# Patient Record
Sex: FEMALE | Race: BLACK OR AFRICAN AMERICAN | NOT HISPANIC OR LATINO
[De-identification: names, ages, dates, MRNs, and addresses within clinical notes are randomized per-mention and may not be internally consistent; named-entity substitution may affect disease eponyms.]

---

## 2019-11-07 PROBLEM — Z00.00 ENCOUNTER FOR PREVENTIVE HEALTH EXAMINATION: Status: ACTIVE | Noted: 2019-11-07

## 2019-12-05 ENCOUNTER — LABORATORY RESULT (OUTPATIENT)
Age: 39
End: 2019-12-05

## 2019-12-05 ENCOUNTER — APPOINTMENT (OUTPATIENT)
Dept: OBGYN | Facility: CLINIC | Age: 39
End: 2019-12-05
Payer: MEDICAID

## 2019-12-05 VITALS
DIASTOLIC BLOOD PRESSURE: 68 MMHG | OXYGEN SATURATION: 98 % | HEIGHT: 60 IN | SYSTOLIC BLOOD PRESSURE: 124 MMHG | HEART RATE: 114 BPM | BODY MASS INDEX: 33.18 KG/M2 | WEIGHT: 169 LBS

## 2019-12-05 DIAGNOSIS — D64.9 ANEMIA, UNSPECIFIED: ICD-10-CM

## 2019-12-05 DIAGNOSIS — Z78.9 OTHER SPECIFIED HEALTH STATUS: ICD-10-CM

## 2019-12-05 DIAGNOSIS — Z83.3 FAMILY HISTORY OF DIABETES MELLITUS: ICD-10-CM

## 2019-12-05 DIAGNOSIS — Z86.2 PERSONAL HISTORY OF DISEASES OF THE BLOOD AND BLOOD-FORMING ORGANS AND CERTAIN DISORDERS INVOLVING THE IMMUNE MECHANISM: ICD-10-CM

## 2019-12-05 DIAGNOSIS — Z80.0 FAMILY HISTORY OF MALIGNANT NEOPLASM OF DIGESTIVE ORGANS: ICD-10-CM

## 2019-12-05 DIAGNOSIS — D21.9 BENIGN NEOPLASM OF CONNECTIVE AND OTHER SOFT TISSUE, UNSPECIFIED: ICD-10-CM

## 2019-12-05 LAB
HCG UR QL: NEGATIVE
QUALITY CONTROL: YES

## 2019-12-05 PROCEDURE — 81025 URINE PREGNANCY TEST: CPT

## 2019-12-05 PROCEDURE — 58100 BIOPSY OF UTERUS LINING: CPT

## 2019-12-05 PROCEDURE — 99203 OFFICE O/P NEW LOW 30 MIN: CPT | Mod: 25

## 2019-12-05 NOTE — REVIEW OF SYSTEMS
[Feeling Tired] : feeling tired [Palpitations] : palpitations [Pelvic Pain] : pelvic pain [Abn Vag Bleeding] : abnormal vaginal bleeding [Headache] : headache [Anxiety] : anxiety [Nl] : Hematologic/Lymphatic

## 2019-12-05 NOTE — PROCEDURE
[Endometrial Biopsy] : Endometrial biopsy [Risks] : risks [Benefits] : benefits [Alternatives] : alternatives [Patient] : patient [Infection] : infection [Bleeding] : bleeding [Allergic Reaction] : allergic reaction [Uterine Perforation] : uterine perforation [Pain] : pain [CONSENT OBTAINED] : written consent was obtained prior to the procedure. [Neg Pregnancy Test] : a pregnancy test was negative [Betadine] : Betadine [Badillo Curette] : a Badillo curette [Moderate] : a moderate [Sent to Histology] : the specimen was place in buffered formalin and sent for pathlogy [Tolerated Well] : the patient tolerated the procedure well [No Complications] : there were no complications [de-identified] : VERONICA [de-identified] : allis

## 2019-12-05 NOTE — PHYSICAL EXAM
[Normal] : uterus [Moderate] : there was moderate vaginal bleeding [Enlarged ___ wks] : enlarged [unfilled] ~Uweeks [Uterine Adnexae] : were not tender and not enlarged

## 2019-12-05 NOTE — COUNSELING
[Breast Self Exam] : breast self exam [Nutrition] : nutrition [Exercise] : exercise [Vitamins/Supplements] : vitamins/supplements [STD (testing, results, tx)] : STD (testing, results, tx) [Contraception] : contraception [Medication Management] : medication management [Pre/Post Op Instructions] : pre/post op instructions

## 2019-12-05 NOTE — CHIEF COMPLAINT
[Initial Visit] : initial GYN visit [FreeTextEntry1] : AUB 11/2018 --TVS found myoma and immediately treated with UFE--improved ,myoma smaller.Now AUB x 2 mos--poss DUB never originally addressed. Pt h/o anemia ,feels light heade at times and palps with climbing stairs. refuses to go to ED--disc risks severe anemia incldg  but not limited to mortality/mobidity. Pt has 2 kids 2011,2012--c/s .Currently not employed--to start new job sev wks.Pt had been on OCP--improved AUB but then spotting again-ran out of. Pt had Paraguard x 8 yrs and passed  due to heavy clots according to pt

## 2019-12-10 ENCOUNTER — OTHER (OUTPATIENT)
Age: 39
End: 2019-12-10

## 2019-12-10 ENCOUNTER — ASOB RESULT (OUTPATIENT)
Age: 39
End: 2019-12-10

## 2019-12-10 ENCOUNTER — APPOINTMENT (OUTPATIENT)
Dept: OBGYN | Facility: CLINIC | Age: 39
End: 2019-12-10
Payer: MEDICAID

## 2019-12-10 PROCEDURE — 76857 US EXAM PELVIC LIMITED: CPT

## 2019-12-10 PROCEDURE — 76830 TRANSVAGINAL US NON-OB: CPT

## 2019-12-11 LAB — CORE LAB BIOPSY: NORMAL

## 2019-12-23 NOTE — REVIEW OF SYSTEMS
[Feeling Tired] : feeling tired [Abn Vag Bleeding] : abnormal vaginal bleeding [Anxiety] : anxiety [Nl] : Hematologic/Lymphatic

## 2019-12-24 ENCOUNTER — APPOINTMENT (OUTPATIENT)
Dept: OBGYN | Facility: CLINIC | Age: 39
End: 2019-12-24
Payer: MEDICAID

## 2019-12-24 VITALS
DIASTOLIC BLOOD PRESSURE: 70 MMHG | WEIGHT: 169 LBS | BODY MASS INDEX: 33.18 KG/M2 | HEIGHT: 60 IN | HEART RATE: 101 BPM | OXYGEN SATURATION: 98 % | SYSTOLIC BLOOD PRESSURE: 110 MMHG

## 2019-12-24 DIAGNOSIS — N92.6 IRREGULAR MENSTRUATION, UNSPECIFIED: ICD-10-CM

## 2019-12-24 DIAGNOSIS — N93.9 ABNORMAL UTERINE AND VAGINAL BLEEDING, UNSPECIFIED: ICD-10-CM

## 2019-12-24 PROCEDURE — 99214 OFFICE O/P EST MOD 30 MIN: CPT

## 2019-12-25 NOTE — CHIEF COMPLAINT
[Follow Up] : follow up GYN visit [FreeTextEntry1] : menstrual disorde/AUBr.Pt doing well with po progestin.Rec endom sampling ,pelvic sono and management options--TXA/HTA,Mirena\par P2 c/s

## 2020-01-06 ENCOUNTER — APPOINTMENT (OUTPATIENT)
Dept: OBGYN | Facility: HOSPITAL | Age: 40
End: 2020-01-06

## 2020-02-25 ENCOUNTER — APPOINTMENT (OUTPATIENT)
Dept: OBGYN | Facility: CLINIC | Age: 40
End: 2020-02-25

## 2020-03-02 ENCOUNTER — OUTPATIENT (OUTPATIENT)
Dept: OUTPATIENT SERVICES | Facility: HOSPITAL | Age: 40
LOS: 1 days | End: 2020-03-02
Payer: MEDICAID

## 2020-03-02 VITALS
RESPIRATION RATE: 16 BRPM | SYSTOLIC BLOOD PRESSURE: 136 MMHG | TEMPERATURE: 99 F | HEART RATE: 95 BPM | OXYGEN SATURATION: 98 % | WEIGHT: 173.28 LBS | HEIGHT: 61 IN | DIASTOLIC BLOOD PRESSURE: 87 MMHG

## 2020-03-02 DIAGNOSIS — N93.9 ABNORMAL UTERINE AND VAGINAL BLEEDING, UNSPECIFIED: ICD-10-CM

## 2020-03-02 DIAGNOSIS — Z98.891 HISTORY OF UTERINE SCAR FROM PREVIOUS SURGERY: Chronic | ICD-10-CM

## 2020-03-02 DIAGNOSIS — D25.9 LEIOMYOMA OF UTERUS, UNSPECIFIED: Chronic | ICD-10-CM

## 2020-03-02 DIAGNOSIS — Z01.818 ENCOUNTER FOR OTHER PREPROCEDURAL EXAMINATION: ICD-10-CM

## 2020-03-02 LAB
HCT VFR BLD CALC: 37.7 % — SIGNIFICANT CHANGE UP (ref 34.5–45)
HGB BLD-MCNC: 11.9 G/DL — SIGNIFICANT CHANGE UP (ref 11.5–15.5)
MCHC RBC-ENTMCNC: 25.2 PG — LOW (ref 27–34)
MCHC RBC-ENTMCNC: 31.6 GM/DL — LOW (ref 32–36)
MCV RBC AUTO: 79.7 FL — LOW (ref 80–100)
NRBC # BLD: 0 /100 WBCS — SIGNIFICANT CHANGE UP (ref 0–0)
PLATELET # BLD AUTO: 235 K/UL — SIGNIFICANT CHANGE UP (ref 150–400)
RBC # BLD: 4.73 M/UL — SIGNIFICANT CHANGE UP (ref 3.8–5.2)
RBC # FLD: 17.1 % — HIGH (ref 10.3–14.5)
WBC # BLD: 4.61 K/UL — SIGNIFICANT CHANGE UP (ref 3.8–10.5)
WBC # FLD AUTO: 4.61 K/UL — SIGNIFICANT CHANGE UP (ref 3.8–10.5)

## 2020-03-02 PROCEDURE — 85027 COMPLETE CBC AUTOMATED: CPT

## 2020-03-02 PROCEDURE — G0463: CPT

## 2020-03-02 PROCEDURE — 36415 COLL VENOUS BLD VENIPUNCTURE: CPT

## 2020-03-02 NOTE — H&P PST ADULT - MUSCULOSKELETAL
negative detailed exam no joint swelling/no calf tenderness/ROM intact/no joint warmth/no joint erythema/normal strength

## 2020-03-02 NOTE — H&P PST ADULT - NSANTHOSAYNRD_GEN_A_CORE
No. SASHA screening performed.  STOP BANG Legend: 0-2 = LOW Risk; 3-4 = INTERMEDIATE Risk; 5-8 = HIGH Risk/neck 15 3/4inches

## 2020-03-02 NOTE — H&P PST ADULT - ANESTHESIA, PREVIOUS REACTION, PROFILE
reports s/p  she was very itchy - reports she was never told if it was a reaction to anesthesia. denies fmhx

## 2020-03-02 NOTE — H&P PST ADULT - HISTORY OF PRESENT ILLNESS
39yo female medical h/o Anemia related to abnormal vaginal/uterine bleeding, started 10/2019. Pt presents today for PST D&C, Hysteroscopy Possible Resectoscope Tessa, HTA, Mirena scheduled for 3/16/2020

## 2020-03-02 NOTE — H&P PST ADULT - NEGATIVE GENERAL GENITOURINARY SYMPTOMS
no flank pain L/no incontinence/no gas in urine/no urinary hesitancy/no flank pain R/no urine discoloration/no bladder infections/no hematuria/no renal colic/no dysuria/normal urinary frequency

## 2020-03-02 NOTE — H&P PST ADULT - NSICDXPROBLEM_GEN_ALL_CORE_FT
PROBLEM DIAGNOSES  Problem: Abnormal uterine and vaginal bleeding, unspecified  Assessment and Plan: Pre-op instructiosn given. Pt verbalized understanding  UCG ordered STAT for day of procedure PROBLEM DIAGNOSES  Problem: Abnormal uterine and vaginal bleeding, unspecified  Assessment and Plan: Pre-op instructions given. Pt verbalized understanding  UCG ordered STAT for day of procedure

## 2020-03-25 PROBLEM — D64.9 ANEMIA, UNSPECIFIED: Chronic | Status: ACTIVE | Noted: 2020-03-02

## 2020-06-26 NOTE — H&P PST ADULT - SKIN/BREAST
[phone]  [place]namese  [id number]  PAIN LEVEL 0    Chief Complaint   Patient presents with    Results     per pt wants to discuss results from XRAY and ultrasound 6-2-20     Alopecia     increased hair loss. pt reports it started after recent birth of child. x3 months      1. Have you been to the ER, urgent care clinic since your last visit? Hospitalized since your last visit? No    2. Have you seen or consulted any other health care providers outside of the [address] [place] [address] since your last visit? Include any pap smears or colon screening. No     Health Maintenance Due   Topic Date Due    DTaP/Tdap/Td series (1 - Tdap) 02/07/2011    PAP AKA CERVICAL CYTOLOGY  02/07/2011     3 most recent PHQ Screens 5/22/2020   Little interest or pleasure in doing things Not at all   Feeling down, depressed, irritable, or hopeless Not at all   Total Score PHQ 2 0     Recent Travel Screening and Travel History documentation     Travel Screening       Question Response     In the last month, have you been in contact with someone who was confirmed or suspected to have Coronavirus / COVID-19? No / Unsure     Do you have any of the following symptoms? None of these     Have you traveled internationally in the last month?  No      Travel History   Travel since 05/26/20     No documented travel since 05/26/20 negative

## 2020-07-01 RX ORDER — FERROUS SULFATE 325(65) MG
1 TABLET ORAL
Qty: 0 | Refills: 0 | DISCHARGE

## 2020-07-01 RX ORDER — NORETHINDRONE 0.35 MG/1
5 TABLET ORAL
Qty: 0 | Refills: 0 | DISCHARGE

## 2020-07-02 ENCOUNTER — OUTPATIENT (OUTPATIENT)
Dept: OUTPATIENT SERVICES | Facility: HOSPITAL | Age: 40
LOS: 1 days | End: 2020-07-02
Payer: MEDICAID

## 2020-07-02 VITALS
DIASTOLIC BLOOD PRESSURE: 73 MMHG | OXYGEN SATURATION: 99 % | SYSTOLIC BLOOD PRESSURE: 123 MMHG | RESPIRATION RATE: 16 BRPM | TEMPERATURE: 99 F | WEIGHT: 172.4 LBS | HEIGHT: 60.5 IN | HEART RATE: 88 BPM

## 2020-07-02 DIAGNOSIS — Z01.818 ENCOUNTER FOR OTHER PREPROCEDURAL EXAMINATION: ICD-10-CM

## 2020-07-02 DIAGNOSIS — D64.9 ANEMIA, UNSPECIFIED: ICD-10-CM

## 2020-07-02 DIAGNOSIS — N93.9 ABNORMAL UTERINE AND VAGINAL BLEEDING, UNSPECIFIED: ICD-10-CM

## 2020-07-02 DIAGNOSIS — Z98.891 HISTORY OF UTERINE SCAR FROM PREVIOUS SURGERY: Chronic | ICD-10-CM

## 2020-07-02 DIAGNOSIS — D25.9 LEIOMYOMA OF UTERUS, UNSPECIFIED: Chronic | ICD-10-CM

## 2020-07-02 LAB
HCG SERPL-ACNC: <1 MIU/ML — SIGNIFICANT CHANGE UP
HCT VFR BLD CALC: 41.9 % — SIGNIFICANT CHANGE UP (ref 34.5–45)
HGB BLD-MCNC: 13.2 G/DL — SIGNIFICANT CHANGE UP (ref 11.5–15.5)
MCHC RBC-ENTMCNC: 24.7 PG — LOW (ref 27–34)
MCHC RBC-ENTMCNC: 31.5 GM/DL — LOW (ref 32–36)
MCV RBC AUTO: 78.3 FL — LOW (ref 80–100)
NRBC # BLD: 0 /100 WBCS — SIGNIFICANT CHANGE UP (ref 0–0)
PLATELET # BLD AUTO: 250 K/UL — SIGNIFICANT CHANGE UP (ref 150–400)
RBC # BLD: 5.35 M/UL — HIGH (ref 3.8–5.2)
RBC # FLD: 17.6 % — HIGH (ref 10.3–14.5)
WBC # BLD: 4.56 K/UL — SIGNIFICANT CHANGE UP (ref 3.8–10.5)
WBC # FLD AUTO: 4.56 K/UL — SIGNIFICANT CHANGE UP (ref 3.8–10.5)

## 2020-07-02 PROCEDURE — 84702 CHORIONIC GONADOTROPIN TEST: CPT

## 2020-07-02 PROCEDURE — 36415 COLL VENOUS BLD VENIPUNCTURE: CPT

## 2020-07-02 PROCEDURE — 85027 COMPLETE CBC AUTOMATED: CPT

## 2020-07-02 PROCEDURE — G0463: CPT

## 2020-07-02 NOTE — H&P PST ADULT - NEGATIVE GENERAL GENITOURINARY SYMPTOMS
no renal colic/no flank pain R/normal urinary frequency/no incontinence/no urinary hesitancy/no flank pain L/no hematuria/no urine discoloration/no gas in urine/no dysuria/no bladder infections

## 2020-07-02 NOTE — H&P PST ADULT - BLOOD TRANSFUSION, PREVIOUS, PROFILE
yes/low H&H s/p [facility]

## 2020-07-02 NOTE — H&P PST ADULT - GENITOURINARY COMMENTS
c/o abnormal vaginal bleeding/spotting since 10/2019 11/2019 on norethindrone which has helped to stop bleeding

## 2020-07-02 NOTE — H&P PST ADULT - NSICDXPROBLEM_GEN_ALL_CORE_FT
PROBLEM DIAGNOSES  Problem: Abnormal uterine and vaginal bleeding  Assessment and Plan: Pre-op instructions given. Pt verbalized understanding   Pending; Covid19 results to be done 7/3    Problem: Anemia  Assessment and Plan: Pt instructed to take meds as prescribed

## 2020-07-02 NOTE — H&P PST ADULT - HISTORY OF PRESENT ILLNESS
39yo female medical h/o Anemia related to abnormal vaginal/uterine bleeding, started 10/2019. Pt presents today for PST D&C, Hysteroscopy Possible Resectoscope Tessa, HTA, Mirena scheduled for 7/6/2020

## 2020-07-02 NOTE — H&P PST ADULT - NSANTHOSAYNRD_GEN_A_CORE
No. SASHA screening performed.  STOP BANG Legend: 0-2 = LOW Risk; 3-4 = INTERMEDIATE Risk; 5-8 = HIGH Risk/neck 15inches

## 2020-07-03 ENCOUNTER — APPOINTMENT (OUTPATIENT)
Dept: DISASTER EMERGENCY | Facility: CLINIC | Age: 40
End: 2020-07-03

## 2020-07-03 DIAGNOSIS — Z01.818 ENCOUNTER FOR OTHER PREPROCEDURAL EXAMINATION: ICD-10-CM

## 2020-07-03 PROBLEM — K21.9 GASTRO-ESOPHAGEAL REFLUX DISEASE WITHOUT ESOPHAGITIS: Chronic | Status: ACTIVE | Noted: 2020-07-01

## 2020-07-03 PROBLEM — N92.0 EXCESSIVE AND FREQUENT MENSTRUATION WITH REGULAR CYCLE: Chronic | Status: ACTIVE | Noted: 2020-07-01

## 2020-07-03 PROBLEM — Z87.42 PERSONAL HISTORY OF OTHER DISEASES OF THE FEMALE GENITAL TRACT: Chronic | Status: ACTIVE | Noted: 2020-07-01

## 2020-07-03 LAB — SARS-COV-2 N GENE NPH QL NAA+PROBE: NOT DETECTED

## 2020-07-05 ENCOUNTER — TRANSCRIPTION ENCOUNTER (OUTPATIENT)
Age: 40
End: 2020-07-05

## 2020-07-06 ENCOUNTER — OUTPATIENT (OUTPATIENT)
Dept: OUTPATIENT SERVICES | Facility: HOSPITAL | Age: 40
LOS: 1 days | End: 2020-07-06
Payer: MEDICAID

## 2020-07-06 ENCOUNTER — RESULT REVIEW (OUTPATIENT)
Age: 40
End: 2020-07-06

## 2020-07-06 ENCOUNTER — APPOINTMENT (OUTPATIENT)
Dept: OBGYN | Facility: HOSPITAL | Age: 40
End: 2020-07-06

## 2020-07-06 VITALS
OXYGEN SATURATION: 97 % | RESPIRATION RATE: 14 BRPM | TEMPERATURE: 98 F | HEART RATE: 87 BPM | SYSTOLIC BLOOD PRESSURE: 123 MMHG | DIASTOLIC BLOOD PRESSURE: 81 MMHG

## 2020-07-06 VITALS
DIASTOLIC BLOOD PRESSURE: 84 MMHG | WEIGHT: 172.4 LBS | SYSTOLIC BLOOD PRESSURE: 123 MMHG | RESPIRATION RATE: 14 BRPM | HEART RATE: 84 BPM | HEIGHT: 60.5 IN | TEMPERATURE: 98 F | OXYGEN SATURATION: 99 %

## 2020-07-06 DIAGNOSIS — Z98.891 HISTORY OF UTERINE SCAR FROM PREVIOUS SURGERY: Chronic | ICD-10-CM

## 2020-07-06 DIAGNOSIS — D25.9 LEIOMYOMA OF UTERUS, UNSPECIFIED: Chronic | ICD-10-CM

## 2020-07-06 DIAGNOSIS — N93.9 ABNORMAL UTERINE AND VAGINAL BLEEDING, UNSPECIFIED: ICD-10-CM

## 2020-07-06 PROCEDURE — 88305 TISSUE EXAM BY PATHOLOGIST: CPT

## 2020-07-06 PROCEDURE — 88305 TISSUE EXAM BY PATHOLOGIST: CPT | Mod: 26

## 2020-07-06 PROCEDURE — 58563 HYSTEROSCOPY ABLATION: CPT | Mod: AS

## 2020-07-06 PROCEDURE — 58300 INSERT INTRAUTERINE DEVICE: CPT

## 2020-07-06 PROCEDURE — 58563 HYSTEROSCOPY ABLATION: CPT

## 2020-07-06 RX ORDER — HYDROMORPHONE HYDROCHLORIDE 2 MG/ML
0.5 INJECTION INTRAMUSCULAR; INTRAVENOUS; SUBCUTANEOUS
Refills: 0 | Status: DISCONTINUED | OUTPATIENT
Start: 2020-07-06 | End: 2020-07-06

## 2020-07-06 RX ORDER — SODIUM CHLORIDE 9 MG/ML
1000 INJECTION, SOLUTION INTRAVENOUS
Refills: 0 | Status: DISCONTINUED | OUTPATIENT
Start: 2020-07-06 | End: 2020-07-06

## 2020-07-06 RX ORDER — OMEPRAZOLE 10 MG/1
1 CAPSULE, DELAYED RELEASE ORAL
Qty: 0 | Refills: 0 | DISCHARGE

## 2020-07-06 RX ORDER — FERROUS SULFATE 325(65) MG
1 TABLET ORAL
Qty: 0 | Refills: 0 | DISCHARGE

## 2020-07-06 RX ORDER — OXYCODONE HYDROCHLORIDE 5 MG/1
5 TABLET ORAL ONCE
Refills: 0 | Status: DISCONTINUED | OUTPATIENT
Start: 2020-07-06 | End: 2020-07-06

## 2020-07-06 RX ORDER — NORETHINDRONE 0.35 MG/1
5 TABLET ORAL
Qty: 0 | Refills: 0 | DISCHARGE

## 2020-07-06 RX ORDER — ONDANSETRON 8 MG/1
4 TABLET, FILM COATED ORAL ONCE
Refills: 0 | Status: DISCONTINUED | OUTPATIENT
Start: 2020-07-06 | End: 2020-07-06

## 2020-07-06 RX ORDER — OXYCODONE AND ACETAMINOPHEN 5; 325 MG/1; MG/1
1 TABLET ORAL EVERY 4 HOURS
Refills: 0 | Status: DISCONTINUED | OUTPATIENT
Start: 2020-07-06 | End: 2020-07-06

## 2020-07-06 RX ADMIN — SODIUM CHLORIDE 50 MILLILITER(S): 9 INJECTION, SOLUTION INTRAVENOUS at 08:09

## 2020-07-06 RX ADMIN — HYDROMORPHONE HYDROCHLORIDE 0.5 MILLIGRAM(S): 2 INJECTION INTRAMUSCULAR; INTRAVENOUS; SUBCUTANEOUS at 11:15

## 2020-07-06 NOTE — BRIEF OPERATIVE NOTE - NSICDXBRIEFPROCEDURE_GEN_ALL_CORE_FT
PROCEDURES:  D&C (dilatation and curettage, scraping of uterus) 06-Jul-2020 10:43:09 HTA and Fe Frias

## 2020-07-06 NOTE — ASU DISCHARGE PLAN (ADULT/PEDIATRIC) - CARE PROVIDER_API CALL
Terrie Ceballos  OBSTETRICS AND GYNECOLOGY  [address] [address] [place], [place] [address]  Phone: [phone]  Fax: [phone]  Follow Up Time:

## 2020-07-06 NOTE — ASU DISCHARGE PLAN (ADULT/PEDIATRIC) - ASU DC SPECIAL INSTRUCTIONSFT
This patient may be discharged home today  Take pain medication as prescribed , then switch to tylenol/motrin   follow up with Dr Ceballos in 6 -8 weeks  complete pelvic rest x 2 weeks

## 2020-07-06 NOTE — ASU PATIENT PROFILE, ADULT - PMH
Anemia    GERD (gastroesophageal reflux disease)  on occasion  History of irregular menstrual cycles    Menorrhagia

## 2020-07-07 LAB — SURGICAL PATHOLOGY STUDY: SIGNIFICANT CHANGE UP

## 2020-08-21 ENCOUNTER — APPOINTMENT (OUTPATIENT)
Dept: OBGYN | Facility: CLINIC | Age: 40
End: 2020-08-21

## 2021-11-24 ENCOUNTER — APPOINTMENT (OUTPATIENT)
Dept: OBGYN | Facility: CLINIC | Age: 41
End: 2021-11-24

## 2021-12-07 NOTE — BRIEF OPERATIVE NOTE - CONDITION POST OP
stable
Learning behavioral activities to cope with urges.  For example, distraction and changing routines

## 2022-05-17 ENCOUNTER — APPOINTMENT (OUTPATIENT)
Dept: OBGYN | Facility: CLINIC | Age: 42
End: 2022-05-17
Payer: COMMERCIAL

## 2022-05-17 VITALS
WEIGHT: 170 LBS | SYSTOLIC BLOOD PRESSURE: 130 MMHG | HEART RATE: 82 BPM | BODY MASS INDEX: 33.38 KG/M2 | DIASTOLIC BLOOD PRESSURE: 82 MMHG | HEIGHT: 60 IN

## 2022-05-17 DIAGNOSIS — R35.0 FREQUENCY OF MICTURITION: ICD-10-CM

## 2022-05-17 DIAGNOSIS — Z11.3 ENCOUNTER FOR SCREENING FOR INFECTIONS WITH A PREDOMINANTLY SEXUAL MODE OF TRANSMISSION: ICD-10-CM

## 2022-05-17 PROCEDURE — 99213 OFFICE O/P EST LOW 20 MIN: CPT

## 2022-05-17 RX ORDER — NORETHINDRONE ACETATE 5 MG/1
5 TABLET ORAL
Qty: 60 | Refills: 2 | Status: DISCONTINUED | COMMUNITY
Start: 2019-12-05 | End: 2022-05-17

## 2022-05-17 RX ORDER — BACILLUS COAGULANS/INULIN 1B-250 MG
CAPSULE ORAL
Refills: 0 | Status: ACTIVE | COMMUNITY

## 2022-05-17 RX ORDER — VITAMIN E ACETATE 670 MG
325 (65 FE) CAPSULE ORAL
Refills: 0 | Status: DISCONTINUED | COMMUNITY
End: 2022-05-17

## 2022-05-17 NOTE — PHYSICAL EXAM
[Labia Majora] : normal [Labia Minora] : normal [Discharge] : a  ~M vaginal discharge was present [Scant] : scant [Thick] : thick [No Bleeding] : There was no active vaginal bleeding [Normal] : normal [IUD String] : an IUD string was noted [Tenderness] : nontender [Uterine Adnexae] : non-palpable

## 2022-05-19 ENCOUNTER — NON-APPOINTMENT (OUTPATIENT)
Age: 42
End: 2022-05-19

## 2022-05-19 LAB
BACTERIA UR CULT: NORMAL
C TRACH RRNA SPEC QL NAA+PROBE: NOT DETECTED
CANDIDA VAG CYTO: NOT DETECTED
G VAGINALIS+PREV SP MTYP VAG QL MICRO: NOT DETECTED
HBV SURFACE AG SER QL: NONREACTIVE
HCV AB SER QL: NONREACTIVE
HCV S/CO RATIO: 0.15 S/CO
HIV1+2 AB SPEC QL IA.RAPID: NONREACTIVE
N GONORRHOEA RRNA SPEC QL NAA+PROBE: NOT DETECTED
SOURCE AMPLIFICATION: NORMAL
T PALLIDUM AB SER QL IA: NEGATIVE
T VAGINALIS VAG QL WET PREP: NOT DETECTED

## 2022-05-20 ENCOUNTER — NON-APPOINTMENT (OUTPATIENT)
Age: 42
End: 2022-05-20

## 2022-11-08 NOTE — H&P PST ADULT - HEIGHT IN INCHES
Patient presents for a dental restoration and verbally consents for treatment:  Reviewed health history-  Pt is ASA type I  Treatment consents signed: Yes  Perio: Healthy  Pain Scale: 0  Caries Assessment: Medium    Radiographs: Films are current  Oral Hygiene instruction reviewed and given  Hygiene recall visits recommended to the patient    Patient agrees with the diagnosis of Caries and the proposed treatment plan for the resin restoration:  Tooth ##29  Dental Anesthesia:  No anesthesia  Material:   Etch Ivoclar bond and resin   Shade: Shade A2    Prognosis is Good     Referrals Needed: No  Next visit: Reevaluation # 15 after 2 months
1

## 2024-01-26 ENCOUNTER — APPOINTMENT (OUTPATIENT)
Dept: OBGYN | Facility: CLINIC | Age: 44
End: 2024-01-26
Payer: COMMERCIAL

## 2024-01-26 VITALS
DIASTOLIC BLOOD PRESSURE: 76 MMHG | SYSTOLIC BLOOD PRESSURE: 130 MMHG | HEIGHT: 60 IN | WEIGHT: 176 LBS | BODY MASS INDEX: 34.55 KG/M2

## 2024-01-26 DIAGNOSIS — N89.8 OTHER SPECIFIED NONINFLAMMATORY DISORDERS OF VAGINA: ICD-10-CM

## 2024-01-26 DIAGNOSIS — Z01.419 ENCOUNTER FOR GYNECOLOGICAL EXAMINATION (GENERAL) (ROUTINE) W/OUT ABNORMAL FINDINGS: ICD-10-CM

## 2024-01-26 DIAGNOSIS — Z01.411 ENCOUNTER FOR GYNECOLOGICAL EXAMINATION (GENERAL) (ROUTINE) WITH ABNORMAL FINDINGS: ICD-10-CM

## 2024-01-26 PROCEDURE — 36415 COLL VENOUS BLD VENIPUNCTURE: CPT

## 2024-01-26 PROCEDURE — 99396 PREV VISIT EST AGE 40-64: CPT

## 2024-01-26 RX ORDER — METRONIDAZOLE 500 MG/1
500 TABLET ORAL TWICE DAILY
Qty: 14 | Refills: 0 | Status: ACTIVE | COMMUNITY
Start: 2024-01-26 | End: 1900-01-01

## 2024-01-26 NOTE — HISTORY OF PRESENT ILLNESS
[FreeTextEntry1] : 44yo  LMP 1/15/24 here for annual exam.  Reports troublesome vaginal odor.  h/o AUB w/heavy menses s/p UFE  and Mirena IUD .  Pt is cosidering IUD removal as she is not sure she wants any hormonal management of AUB any longer. [No] : Patient does not have concerns regarding sex [Currently Active] : currently active

## 2024-01-26 NOTE — DISCUSSION/SUMMARY
[FreeTextEntry1] : - Pap/HPV obtained today - Contraceptive options reviewed; pt has Mirena IUD but considering removal due to complaints of vaginal discharge as well desire to no longer use hormones - STI testing offered; done - Mammo/Sono requisition given - Pelvic sono referral rendered due to h/o fibroid uterus - Flagyl sent to pharmacy due to clinical e/o BV

## 2024-01-30 LAB
BACTERIA UR CULT: NORMAL
C TRACH RRNA SPEC QL NAA+PROBE: NOT DETECTED
HBV SURFACE AG SER QL: NONREACTIVE
HCV AB SER QL: NONREACTIVE
HCV S/CO RATIO: 0.14 S/CO
HIV1+2 AB SPEC QL IA.RAPID: NONREACTIVE
HPV HIGH+LOW RISK DNA PNL CVX: NOT DETECTED
N GONORRHOEA RRNA SPEC QL NAA+PROBE: NOT DETECTED
SOURCE AMPLIFICATION: NORMAL
SOURCE TP AMPLIFICATION: NORMAL
T PALLIDUM AB SER QL IA: NEGATIVE
T VAGINALIS RRNA SPEC QL NAA+PROBE: NOT DETECTED

## 2024-01-31 LAB — CYTOLOGY CVX/VAG DOC THIN PREP: NORMAL

## 2025-01-07 NOTE — H&P PST ADULT - MUSCULOSKELETAL
Digna Solano is a 86 year old female presenting with daughter for follow-up visit she was seen in the ER after she injured left hand laceration is well-healed.  She underwent EGD yesterday.  There was no need for dilation.  She works with speech therapy.  She is able to swallow all of the medications well potassium was switched to liquid.  She takes potassium supplement to reduce risk of recurrent stones.  She eats better.  Daughter admits sometimes mom skips breakfast she waits for her  to wake up.  She usually eats breakfast with lunch.  I encouraged 3 regular meals a day we talked about importance of protein and fiber.  We talked about nuts and fruits.  We talked about bowels and MiraLAX.    Current Outpatient Medications   Medication Sig Dispense Refill    Effer-K 10 MEQ Effer Tab DISSOLVE 1 TABLET COMPLETELY IN 2 TO 3 OUNCES OF COLD OR ICE WATER AND DRINK ONCE DAILY.      benzonatate (TESSALON PERLES) 100 MG capsule Take 1 capsule by mouth 3 times daily as needed for Cough. 60 capsule 0    Acetaminophen Extra Strength 500 MG Tab       clopidogrel (PLAVIX) 75 MG tablet TAKE 1 TABLET BY MOUTH EVERY DAY 90 tablet 1    pantoprazole (PROTONIX) 40 MG tablet TAKE 1 TABLET BY MOUTH EVERY DAY 90 tablet 1    polyethylene glycol (MIRALAX) 17 g packet Take 17 g by mouth daily as needed (constipation). Stir and dissolve powder in any 4 to 8 ounces of beverage, then drink.      lisinopril (ZESTRIL) 2.5 MG tablet Take 2.5 mg by mouth nightly. 90 tablet 3    atorvastatin (LIPITOR) 10 MG tablet Take 1 tablet by mouth every other day. Due for Lipid/labs 90 tablet 3    urea (CARMOL 20) 20 % cream Apply 1 application. topically nightly. For feet 85 g 1    cyanocobalamin (CYANOCOBALAMIN) 1000 MCG tablet Take 1 tablet by mouth daily. 30 tablet 0    aspirin 81 MG chewable tablet Chew 1 tablet by mouth daily. Do not start before May 15, 2021.      Cholecalciferol 50 mcg (2,000 units) capsule Take 50 mcg by mouth daily.       Denosumab (PROLIA SC) Inject into the skin every 6 months.       No current facility-administered medications for this visit.       Allergies as of 01/07/2025 - Reviewed 01/07/2025   Allergen Reaction Noted    Penicillins ANAPHYLAXIS 09/13/2007       PAST MEDICAL HISTORY:    High cholesterol                                              Factor 5 Leiden mutation, heterozygous  (CMD)                 DVT (deep venous thrombosis)  (CMD)                           Essential (primary) hypertension                              Coronary artery disease                                       Gastroesophageal reflux disease                               Cerebral infarction (CMD)                       2015          Kidney cysts                                                  Dysphagia                                                     ESTEFANY (obstructive sleep apnea)                                   Comment: CPAP    Malignant neoplasm  (CMD)                                       Comment: right arm, skin cancer    Stroke  (CMD)                                                 TIA (transient ischemic attack)                               Squamous cell skin cancer                                     Acute confusion                                               Impaired mobility and activities of daily radha* 06/18/2020    SOCIAL HISTORY:  Marital status:   Alcohol Use: No  Tobacco use: No    REVIEW OF SYSTEMS:  General: No recent symptoms of infections, fever or chills. Good energy level.  HEENT:  No visual disturbance, blurred vision, double vision or sudden vision loss. No change in hearing.  Cardiovascular: Denies chest pain, chest pressure, palpitations or claudication.  Respiratory:  Denies any shortness of breath, pleurisy, cough.  No sputum.  Gastrointestinal: Appetite is normal. No symptoms of heartburn, dysphagia, abdominal discomfort and bowels are regular. No blood, mucus in the stool.  Genitourinary: No difficulties  voiding.  No burning upon urination.  Skin: Denies rashes or open skin areas.  Musculoskeletal: Full range of motion in all extremities. Normal muscular tone.  Neurologic:  Denies headache, focal weakness or tremors.   Psychiatric:  Denies depression or anxiety, no concerns with remote memory.    PHYSICAL EXAMINATION:  Visit Vitals  /68 (BP Location: RUE - Right upper extremity)   Pulse 88   Temp 97.6 °F (36.4 °C)   Resp 18   Ht 5' 5\" (1.651 m)   Wt 50.1 kg (110 lb 8 oz)   SpO2 98%   BMI 18.39 kg/m²     General:  Alert and oriented times 3. Looks stated age.  Not in acute respiratory distress.  Lymphatics: No neck, axillary or inguinal lymphadenopathy.  Head: Atraumatic. No pressure around sinuses.  Eyes:  PERRLA(Pupils equal, round, reactive to light and accommodation). Conjunctivae are pink. Sclerae are non-icteric.  Ears: Tympanic membranes and ear canals look normal.  Mouth:  Oral mucosa moist. Pharynx not injected.  Neck:  Supple.  Symmetric in appearance.  No lymphadenopathy.  No bruits.  Lungs:: Bilaterally clear to auscultation.  No wheezes or crackles.  Heart:: Regular rate.  No additional heart murmurs or sounds.  Abdomen: Soft, non-distended.  Bowel sounds present in all four quadrants.  No hepatosplenomegaly.  Extremities: No swelling. Palpable pedal pulses.  Neurologic: Grossly intact.  Psychiatric: Pleasant.    ASSESSMENT AND PLAN:  Need for vaccination , she got COVID-vaccine.  2. Laceration of left hand without foreign body, sequela, well-healed I encourage patient to apply Vaseline to both of her hands  3. Unintentional weight loss, weight is slightly better.  We had extensive discussion about diet.  The patient is working with GI and speech therapist.  She had EGD yesterday biopsies are pending.  Gastroenterologist suspects motility disorder.  4. History of kidney stones, patient should continue liquid potassium supplement.  We talked about reducing dose of Protonix from 40 to 20 mg a day in  view of osteoporosis with approval of the GI.   negative detailed exam no joint swelling/ROM intact/normal strength/no joint erythema/no calf tenderness/no joint warmth

## 2025-04-10 NOTE — H&P PST ADULT - NS NEC GEN PE MLT EXAM PC
Patient not medically clear. Patient remains in the ICU. [facility] has left a message with patient's daughter, Tiffanie and son, To in an attempt to obtain more SNF choices. At this time there is not a safe discharge plan in place. Will follow.      04/10/25 1102   Discharge Planning   Home or Post Acute Services Post acute facilities (Rehab/SNF/etc)   Type of Post Acute Facility Services Rehab   Expected Discharge Disposition SNF  (TBD)   Does the patient need discharge transport arranged? Yes   RoundTrip coordination needed? Yes        detailed exam

## 2025-07-18 NOTE — H&P PST ADULT - NSICDXPASTSURGICALHX_GEN_ALL_CORE_FT
Out patient Physical Therapy has been ordered by your provider. [facility] will call you to set up therapy. If you have not heard from them within 24-48 hours of today's appointment, please reach out to them at [phone].   Discontinue knee immobilizer.   Continue weight-bearing as tolerated.  Continue range of motion exercises as instructed.  Ice and elevate as needed.  Tylenol or Motrin for pain.  Follow up in 6 weeks.    We are committed to providing you the best care possible.  If you receive a survey after visiting one of our offices, please take time to share your experience concerning your physician office visit.  These surveys are confidential and no health information about you is shared.  We are eager to improve for you and we are counting on your feedback to help make that happen.     PAST SURGICAL HISTORY:  Fibroid uterus embolization    S/P  ,